# Patient Record
Sex: FEMALE | Race: BLACK OR AFRICAN AMERICAN | Employment: FULL TIME | URBAN - METROPOLITAN AREA
[De-identification: names, ages, dates, MRNs, and addresses within clinical notes are randomized per-mention and may not be internally consistent; named-entity substitution may affect disease eponyms.]

---

## 2017-04-01 ENCOUNTER — HOSPITAL ENCOUNTER (EMERGENCY)
Age: 47
Discharge: HOME | End: 2017-04-01
Attending: EMERGENCY MEDICINE
Payer: COMMERCIAL

## 2017-04-01 VITALS
TEMPERATURE: 98 F | RESPIRATION RATE: 16 BRPM | HEIGHT: 65 IN | WEIGHT: 201 LBS | BODY MASS INDEX: 33.49 KG/M2 | DIASTOLIC BLOOD PRESSURE: 75 MMHG | HEART RATE: 75 BPM | SYSTOLIC BLOOD PRESSURE: 121 MMHG | OXYGEN SATURATION: 100 %

## 2017-04-01 DIAGNOSIS — H10.33 ACUTE BACTERIAL CONJUNCTIVITIS OF BOTH EYES: Primary | ICD-10-CM

## 2017-04-01 PROCEDURE — 99282 EMERGENCY DEPT VISIT SF MDM: CPT

## 2017-04-01 RX ORDER — SULFACETAMIDE SODIUM 100 MG/ML
2 SOLUTION/ DROPS OPHTHALMIC EVERY 4 HOURS
Qty: 10 ML | Refills: 0 | Status: SHIPPED | OUTPATIENT
Start: 2017-04-01 | End: 2017-11-22 | Stop reason: ALTCHOICE

## 2017-04-01 RX ORDER — SPIRONOLACTONE 50 MG/1
50-100 TABLET, FILM COATED ORAL DAILY
Status: ON HOLD | COMMUNITY
End: 2017-11-24 | Stop reason: HOSPADM

## 2017-04-01 ASSESSMENT — ENCOUNTER SYMPTOMS
COUGH: 0
DIARRHEA: 0
EYE DISCHARGE: 1
SHORTNESS OF BREATH: 0
EYE REDNESS: 1
FACIAL SWELLING: 0
VOMITING: 0
ABDOMINAL PAIN: 0
EYE ITCHING: 1
CONSTIPATION: 0
COLOR CHANGE: 0

## 2017-04-01 ASSESSMENT — PAIN DESCRIPTION - ORIENTATION: ORIENTATION: RIGHT;LEFT

## 2017-04-01 ASSESSMENT — PAIN SCALES - GENERAL: PAINLEVEL_OUTOF10: 6

## 2017-04-01 ASSESSMENT — PAIN DESCRIPTION - LOCATION: LOCATION: EYE

## 2017-07-20 ENCOUNTER — APPOINTMENT (OUTPATIENT)
Dept: GENERAL RADIOLOGY | Age: 47
End: 2017-07-20
Payer: COMMERCIAL

## 2017-07-20 ENCOUNTER — HOSPITAL ENCOUNTER (EMERGENCY)
Age: 47
Discharge: HOME | End: 2017-07-20
Attending: EMERGENCY MEDICINE
Payer: COMMERCIAL

## 2017-07-20 VITALS
TEMPERATURE: 99 F | HEART RATE: 77 BPM | BODY MASS INDEX: 32.9 KG/M2 | HEIGHT: 65 IN | DIASTOLIC BLOOD PRESSURE: 65 MMHG | RESPIRATION RATE: 16 BRPM | OXYGEN SATURATION: 99 % | WEIGHT: 197.5 LBS | SYSTOLIC BLOOD PRESSURE: 104 MMHG

## 2017-07-20 DIAGNOSIS — M77.10 LATERAL EPICONDYLITIS  OF ELBOW: Primary | ICD-10-CM

## 2017-07-20 DIAGNOSIS — M25.522 LEFT ELBOW PAIN: ICD-10-CM

## 2017-07-20 DIAGNOSIS — B30.9 VIRAL CONJUNCTIVITIS OF RIGHT EYE: ICD-10-CM

## 2017-07-20 PROCEDURE — 93971 EXTREMITY STUDY: CPT

## 2017-07-20 PROCEDURE — 6370000000 HC RX 637 (ALT 250 FOR IP): Performed by: EMERGENCY MEDICINE

## 2017-07-20 PROCEDURE — 99284 EMERGENCY DEPT VISIT MOD MDM: CPT

## 2017-07-20 PROCEDURE — 73080 X-RAY EXAM OF ELBOW: CPT

## 2017-07-20 RX ORDER — IBUPROFEN 800 MG/1
800 TABLET ORAL EVERY 8 HOURS PRN
Qty: 30 TABLET | Refills: 0 | Status: SHIPPED | OUTPATIENT
Start: 2017-07-20 | End: 2017-11-22 | Stop reason: ALTCHOICE

## 2017-07-20 RX ORDER — IBUPROFEN 800 MG/1
800 TABLET ORAL ONCE
Status: DISCONTINUED | OUTPATIENT
Start: 2017-07-20 | End: 2017-07-20 | Stop reason: HOSPADM

## 2017-07-20 RX ORDER — GENTAMICIN SULFATE 3 MG/ML
2 SOLUTION/ DROPS OPHTHALMIC ONCE
Status: COMPLETED | OUTPATIENT
Start: 2017-07-20 | End: 2017-07-20

## 2017-07-20 RX ORDER — TETRACAINE HYDROCHLORIDE 5 MG/ML
1 SOLUTION OPHTHALMIC ONCE
Status: COMPLETED | OUTPATIENT
Start: 2017-07-20 | End: 2017-07-20

## 2017-07-20 RX ADMIN — FLUORESCEIN SODIUM 1 MG: 1 STRIP OPHTHALMIC at 12:51

## 2017-07-20 RX ADMIN — GENTAMICIN SULFATE 2 DROP: 3 SOLUTION/ DROPS OPHTHALMIC at 13:30

## 2017-07-20 RX ADMIN — TETRACAINE HYDROCHLORIDE 1 DROP: 5 SOLUTION OPHTHALMIC at 11:45

## 2017-07-20 ASSESSMENT — PAIN DESCRIPTION - FREQUENCY: FREQUENCY: CONTINUOUS

## 2017-07-20 ASSESSMENT — PAIN DESCRIPTION - ORIENTATION: ORIENTATION: LEFT

## 2017-07-20 ASSESSMENT — PAIN DESCRIPTION - DESCRIPTORS: DESCRIPTORS: SHARP;SHOOTING;STABBING

## 2017-07-20 ASSESSMENT — PAIN DESCRIPTION - LOCATION: LOCATION: ARM

## 2017-07-20 ASSESSMENT — PAIN SCALES - GENERAL: PAINLEVEL_OUTOF10: 9

## 2017-11-22 ENCOUNTER — APPOINTMENT (OUTPATIENT)
Dept: ULTRASOUND IMAGING | Age: 47
DRG: 684 | End: 2017-11-22
Payer: COMMERCIAL

## 2017-11-22 ENCOUNTER — APPOINTMENT (OUTPATIENT)
Dept: CT IMAGING | Age: 47
DRG: 684 | End: 2017-11-22
Payer: COMMERCIAL

## 2017-11-22 ENCOUNTER — HOSPITAL ENCOUNTER (INPATIENT)
Age: 47
LOS: 2 days | Discharge: HOME | DRG: 684 | End: 2017-11-24
Attending: EMERGENCY MEDICINE | Admitting: INTERNAL MEDICINE
Payer: COMMERCIAL

## 2017-11-22 DIAGNOSIS — I95.9 HYPOTENSION, UNSPECIFIED HYPOTENSION TYPE: ICD-10-CM

## 2017-11-22 DIAGNOSIS — R11.2 NAUSEA VOMITING AND DIARRHEA: Primary | ICD-10-CM

## 2017-11-22 DIAGNOSIS — G47.30 SLEEP APNEA, UNSPECIFIED TYPE: ICD-10-CM

## 2017-11-22 DIAGNOSIS — E86.0 DEHYDRATION: ICD-10-CM

## 2017-11-22 DIAGNOSIS — R19.7 NAUSEA VOMITING AND DIARRHEA: Primary | ICD-10-CM

## 2017-11-22 DIAGNOSIS — N28.9 MILD RENAL INSUFFICIENCY: ICD-10-CM

## 2017-11-22 PROBLEM — N17.9 AKI (ACUTE KIDNEY INJURY) (HCC): Status: ACTIVE | Noted: 2017-11-22

## 2017-11-22 PROBLEM — K52.9 GASTROENTERITIS: Status: ACTIVE | Noted: 2017-11-22

## 2017-11-22 LAB
% CKMB: 1.1 % (ref 0–3)
ABSOLUTE EOS #: 0.1 K/UL (ref 0–0.4)
ABSOLUTE IMMATURE GRANULOCYTE: ABNORMAL K/UL (ref 0–0.3)
ABSOLUTE LYMPH #: 1.8 K/UL (ref 1–4.8)
ABSOLUTE MONO #: 0.6 K/UL (ref 0.2–0.8)
ALBUMIN SERPL-MCNC: 3.6 G/DL (ref 3.5–5.2)
ALBUMIN SERPL-MCNC: 4.3 G/DL (ref 3.5–5.2)
ALBUMIN/GLOBULIN RATIO: ABNORMAL (ref 1–2.5)
ALBUMIN/GLOBULIN RATIO: NORMAL (ref 1–2.5)
ALP BLD-CCNC: 41 U/L (ref 35–104)
ALP BLD-CCNC: 46 U/L (ref 35–104)
ALT SERPL-CCNC: 13 U/L (ref 5–33)
ALT SERPL-CCNC: 15 U/L (ref 5–33)
AMYLASE: 73 U/L (ref 28–100)
ANION GAP SERPL CALCULATED.3IONS-SCNC: 13 MMOL/L (ref 9–17)
ANION GAP SERPL CALCULATED.3IONS-SCNC: 14 MMOL/L (ref 9–17)
AST SERPL-CCNC: 19 U/L
AST SERPL-CCNC: 19 U/L
BASOPHILS # BLD: 0 % (ref 0–2)
BASOPHILS ABSOLUTE: 0 K/UL (ref 0–0.2)
BETA-HYDROXYBUTYRATE: 0.16 MMOL/L (ref 0.02–0.27)
BILIRUB SERPL-MCNC: 0.22 MG/DL (ref 0.3–1.2)
BILIRUB SERPL-MCNC: 0.39 MG/DL (ref 0.3–1.2)
BILIRUBIN DIRECT: <0.08 MG/DL
BILIRUBIN URINE: NEGATIVE
BILIRUBIN, INDIRECT: NORMAL MG/DL (ref 0–1)
BUN BLDV-MCNC: 30 MG/DL (ref 6–20)
BUN BLDV-MCNC: 34 MG/DL (ref 6–20)
BUN/CREAT BLD: 18 (ref 9–20)
BUN/CREAT BLD: 18 (ref 9–20)
C DIFFICILE TOXINS, PCR: NORMAL
CALCIUM SERPL-MCNC: 7.7 MG/DL (ref 8.6–10.4)
CALCIUM SERPL-MCNC: 9.2 MG/DL (ref 8.6–10.4)
CAMPYLOBACTER PCR: NORMAL
CHLORIDE BLD-SCNC: 108 MMOL/L (ref 98–107)
CHLORIDE BLD-SCNC: 94 MMOL/L (ref 98–107)
CHP ED QC CHECK: YES
CK MB: <1 NG/ML
CKMB INTERPRETATION: NORMAL
CO2: 20 MMOL/L (ref 20–31)
CO2: 26 MMOL/L (ref 20–31)
COLOR: YELLOW
COMMENT UA: ABNORMAL
CORTISOL COLLECTION INFO: NORMAL
CORTISOL: 4.5 UG/DL (ref 2.7–18.4)
CREAT SERPL-MCNC: 1.63 MG/DL (ref 0.5–0.9)
CREAT SERPL-MCNC: 1.88 MG/DL (ref 0.5–0.9)
DATE, STOOL #1: NORMAL
DATE, STOOL #2: NORMAL
DATE, STOOL #3: NORMAL
DIFFERENTIAL TYPE: ABNORMAL
DIRECT EXAM: NORMAL
EKG ATRIAL RATE: 70 BPM
EKG P AXIS: 54 DEGREES
EKG P-R INTERVAL: 172 MS
EKG Q-T INTERVAL: 424 MS
EKG QRS DURATION: 82 MS
EKG QTC CALCULATION (BAZETT): 457 MS
EKG R AXIS: 15 DEGREES
EKG T AXIS: 53 DEGREES
EKG VENTRICULAR RATE: 70 BPM
EOSINOPHILS RELATIVE PERCENT: 2 % (ref 1–4)
GFR AFRICAN AMERICAN: 34 ML/MIN
GFR AFRICAN AMERICAN: 41 ML/MIN
GFR NON-AFRICAN AMERICAN: 28 ML/MIN
GFR NON-AFRICAN AMERICAN: 34 ML/MIN
GFR SERPL CREATININE-BSD FRML MDRD: ABNORMAL ML/MIN/{1.73_M2}
GLOBULIN: NORMAL G/DL (ref 1.5–3.8)
GLUCOSE BLD-MCNC: 101 MG/DL (ref 70–99)
GLUCOSE BLD-MCNC: 104 MG/DL
GLUCOSE BLD-MCNC: 104 MG/DL (ref 65–105)
GLUCOSE BLD-MCNC: 92 MG/DL (ref 70–99)
GLUCOSE URINE: NEGATIVE
HCT VFR BLD CALC: 32.4 % (ref 36–46)
HCT VFR BLD CALC: 35.7 % (ref 36–46)
HEMOCCULT SP1 STL QL: NEGATIVE
HEMOCCULT SP2 STL QL: NORMAL
HEMOCCULT SP3 STL QL: NORMAL
HEMOGLOBIN: 10.7 G/DL (ref 12–16)
HEMOGLOBIN: 11.8 G/DL (ref 12–16)
IMMATURE GRANULOCYTES: ABNORMAL %
KETONES, URINE: ABNORMAL
LACTIC ACID: 0.7 MMOL/L (ref 0.5–2.2)
LEUKOCYTE ESTERASE, URINE: NEGATIVE
LIPASE: 13 U/L (ref 13–60)
LYMPHOCYTES # BLD: 24 % (ref 24–44)
Lab: NORMAL
Lab: NORMAL
MAGNESIUM: 2.3 MG/DL (ref 1.6–2.6)
MCH RBC QN AUTO: 28.2 PG (ref 26–34)
MCH RBC QN AUTO: 28.2 PG (ref 26–34)
MCHC RBC AUTO-ENTMCNC: 32.9 G/DL (ref 31–37)
MCHC RBC AUTO-ENTMCNC: 33.1 G/DL (ref 31–37)
MCV RBC AUTO: 85.2 FL (ref 80–100)
MCV RBC AUTO: 85.6 FL (ref 80–100)
MONOCYTES # BLD: 9 % (ref 1–7)
MYOGLOBIN: <21 NG/ML (ref 25–58)
NITRITE, URINE: NEGATIVE
PDW BLD-RTO: 14.7 % (ref 11.5–14.5)
PDW BLD-RTO: 15.5 % (ref 11.5–14.5)
PH UA: 5.5 (ref 5–8)
PLATELET # BLD: 225 K/UL (ref 130–400)
PLATELET # BLD: 272 K/UL (ref 130–400)
PLATELET ESTIMATE: ABNORMAL
PMV BLD AUTO: 9.3 FL (ref 6–12)
PMV BLD AUTO: ABNORMAL FL (ref 6–12)
POTASSIUM SERPL-SCNC: 4.2 MMOL/L (ref 3.7–5.3)
POTASSIUM SERPL-SCNC: 5 MMOL/L (ref 3.7–5.3)
PROTEIN UA: NEGATIVE
RBC # BLD: 3.81 M/UL (ref 4–5.2)
RBC # BLD: 4.17 M/UL (ref 4–5.2)
RBC # BLD: ABNORMAL 10*6/UL
SALMONELLA PCR: NORMAL
SEG NEUTROPHILS: 65 % (ref 36–66)
SEGMENTED NEUTROPHILS ABSOLUTE COUNT: 4.8 K/UL (ref 1.8–7.7)
SHIGATOXIN GENE PCR: NORMAL
SHIGELLA SP PCR: NORMAL
SODIUM BLD-SCNC: 134 MMOL/L (ref 135–144)
SODIUM BLD-SCNC: 141 MMOL/L (ref 135–144)
SPECIFIC GRAVITY UA: 1.02 (ref 1–1.03)
SPECIMEN DESCRIPTION: NORMAL
SPECIMEN: NORMAL
STATUS: NORMAL
STATUS: NORMAL
TIME, STOOL #1: 350
TIME, STOOL #2: NORMAL
TIME, STOOL #3: NORMAL
TOTAL CK: 88 U/L (ref 26–192)
TOTAL PROTEIN: 7.1 G/DL (ref 6.4–8.3)
TOTAL PROTEIN: 8.2 G/DL (ref 6.4–8.3)
TROPONIN INTERP: ABNORMAL
TROPONIN T: <0.03 NG/ML
TURBIDITY: CLEAR
URINE HGB: NEGATIVE
UROBILINOGEN, URINE: NORMAL
WBC # BLD: 6.5 K/UL (ref 3.5–11)
WBC # BLD: 7.3 K/UL (ref 3.5–11)
WBC # BLD: ABNORMAL 10*3/UL

## 2017-11-22 PROCEDURE — 87493 C DIFF AMPLIFIED PROBE: CPT

## 2017-11-22 PROCEDURE — 80053 COMPREHEN METABOLIC PANEL: CPT

## 2017-11-22 PROCEDURE — 84484 ASSAY OF TROPONIN QUANT: CPT

## 2017-11-22 PROCEDURE — 2000000000 HC ICU R&B

## 2017-11-22 PROCEDURE — 82272 OCCULT BLD FECES 1-3 TESTS: CPT

## 2017-11-22 PROCEDURE — 36415 COLL VENOUS BLD VENIPUNCTURE: CPT

## 2017-11-22 PROCEDURE — 83874 ASSAY OF MYOGLOBIN: CPT

## 2017-11-22 PROCEDURE — 85027 COMPLETE CBC AUTOMATED: CPT

## 2017-11-22 PROCEDURE — 2580000003 HC RX 258: Performed by: EMERGENCY MEDICINE

## 2017-11-22 PROCEDURE — 6360000002 HC RX W HCPCS: Performed by: EMERGENCY MEDICINE

## 2017-11-22 PROCEDURE — 82533 TOTAL CORTISOL: CPT

## 2017-11-22 PROCEDURE — 87505 NFCT AGENT DETECTION GI: CPT

## 2017-11-22 PROCEDURE — 85025 COMPLETE CBC W/AUTO DIFF WBC: CPT

## 2017-11-22 PROCEDURE — 93005 ELECTROCARDIOGRAM TRACING: CPT

## 2017-11-22 PROCEDURE — 96375 TX/PRO/DX INJ NEW DRUG ADDON: CPT

## 2017-11-22 PROCEDURE — 6370000000 HC RX 637 (ALT 250 FOR IP): Performed by: NURSE PRACTITIONER

## 2017-11-22 PROCEDURE — 2580000003 HC RX 258: Performed by: INTERNAL MEDICINE

## 2017-11-22 PROCEDURE — 2500000003 HC RX 250 WO HCPCS: Performed by: EMERGENCY MEDICINE

## 2017-11-22 PROCEDURE — 6360000002 HC RX W HCPCS: Performed by: NURSE PRACTITIONER

## 2017-11-22 PROCEDURE — 76775 US EXAM ABDO BACK WALL LIM: CPT

## 2017-11-22 PROCEDURE — 74176 CT ABD & PELVIS W/O CONTRAST: CPT

## 2017-11-22 PROCEDURE — 99223 1ST HOSP IP/OBS HIGH 75: CPT | Performed by: INTERNAL MEDICINE

## 2017-11-22 PROCEDURE — 83605 ASSAY OF LACTIC ACID: CPT

## 2017-11-22 PROCEDURE — 87329 GIARDIA AG IA: CPT

## 2017-11-22 PROCEDURE — 82150 ASSAY OF AMYLASE: CPT

## 2017-11-22 PROCEDURE — 96361 HYDRATE IV INFUSION ADD-ON: CPT

## 2017-11-22 PROCEDURE — 83735 ASSAY OF MAGNESIUM: CPT

## 2017-11-22 PROCEDURE — 87086 URINE CULTURE/COLONY COUNT: CPT

## 2017-11-22 PROCEDURE — 82947 ASSAY GLUCOSE BLOOD QUANT: CPT

## 2017-11-22 PROCEDURE — 80048 BASIC METABOLIC PNL TOTAL CA: CPT

## 2017-11-22 PROCEDURE — 81003 URINALYSIS AUTO W/O SCOPE: CPT

## 2017-11-22 PROCEDURE — 99285 EMERGENCY DEPT VISIT HI MDM: CPT

## 2017-11-22 PROCEDURE — 80076 HEPATIC FUNCTION PANEL: CPT

## 2017-11-22 PROCEDURE — 87425 ROTAVIRUS AG IA: CPT

## 2017-11-22 PROCEDURE — 82550 ASSAY OF CK (CPK): CPT

## 2017-11-22 PROCEDURE — 83690 ASSAY OF LIPASE: CPT

## 2017-11-22 PROCEDURE — C9113 INJ PANTOPRAZOLE SODIUM, VIA: HCPCS | Performed by: EMERGENCY MEDICINE

## 2017-11-22 PROCEDURE — 82010 KETONE BODYS QUAN: CPT

## 2017-11-22 PROCEDURE — 96374 THER/PROPH/DIAG INJ IV PUSH: CPT

## 2017-11-22 PROCEDURE — 82553 CREATINE MB FRACTION: CPT

## 2017-11-22 RX ORDER — SODIUM CHLORIDE 0.9 % (FLUSH) 0.9 %
10 SYRINGE (ML) INJECTION EVERY 12 HOURS SCHEDULED
Status: DISCONTINUED | OUTPATIENT
Start: 2017-11-22 | End: 2017-11-24 | Stop reason: HOSPADM

## 2017-11-22 RX ORDER — PROMETHAZINE HYDROCHLORIDE 25 MG/ML
12.5 INJECTION, SOLUTION INTRAMUSCULAR; INTRAVENOUS ONCE
Status: COMPLETED | OUTPATIENT
Start: 2017-11-22 | End: 2017-11-22

## 2017-11-22 RX ORDER — POTASSIUM CHLORIDE 20 MEQ/1
40 TABLET, EXTENDED RELEASE ORAL PRN
Status: DISCONTINUED | OUTPATIENT
Start: 2017-11-22 | End: 2017-11-24 | Stop reason: HOSPADM

## 2017-11-22 RX ORDER — NICOTINE 21 MG/24HR
1 PATCH, TRANSDERMAL 24 HOURS TRANSDERMAL DAILY PRN
Status: DISCONTINUED | OUTPATIENT
Start: 2017-11-22 | End: 2017-11-22

## 2017-11-22 RX ORDER — DEXTROSE MONOHYDRATE 50 MG/ML
100 INJECTION, SOLUTION INTRAVENOUS PRN
Status: DISCONTINUED | OUTPATIENT
Start: 2017-11-22 | End: 2017-11-24 | Stop reason: HOSPADM

## 2017-11-22 RX ORDER — DEXTROSE MONOHYDRATE 25 G/50ML
12.5 INJECTION, SOLUTION INTRAVENOUS PRN
Status: DISCONTINUED | OUTPATIENT
Start: 2017-11-22 | End: 2017-11-24 | Stop reason: HOSPADM

## 2017-11-22 RX ORDER — POTASSIUM CHLORIDE 7.45 MG/ML
10 INJECTION INTRAVENOUS PRN
Status: DISCONTINUED | OUTPATIENT
Start: 2017-11-22 | End: 2017-11-24 | Stop reason: HOSPADM

## 2017-11-22 RX ORDER — POTASSIUM CHLORIDE 20MEQ/15ML
40 LIQUID (ML) ORAL PRN
Status: DISCONTINUED | OUTPATIENT
Start: 2017-11-22 | End: 2017-11-24 | Stop reason: HOSPADM

## 2017-11-22 RX ORDER — SODIUM CHLORIDE 0.9 % (FLUSH) 0.9 %
10 SYRINGE (ML) INJECTION PRN
Status: DISCONTINUED | OUTPATIENT
Start: 2017-11-22 | End: 2017-11-24 | Stop reason: HOSPADM

## 2017-11-22 RX ORDER — ONDANSETRON 2 MG/ML
8 INJECTION INTRAMUSCULAR; INTRAVENOUS ONCE
Status: COMPLETED | OUTPATIENT
Start: 2017-11-22 | End: 2017-11-22

## 2017-11-22 RX ORDER — 0.9 % SODIUM CHLORIDE 0.9 %
10 VIAL (ML) INJECTION ONCE
Status: COMPLETED | OUTPATIENT
Start: 2017-11-22 | End: 2017-11-22

## 2017-11-22 RX ORDER — MAGNESIUM SULFATE 1 G/100ML
1 INJECTION INTRAVENOUS PRN
Status: DISCONTINUED | OUTPATIENT
Start: 2017-11-22 | End: 2017-11-24 | Stop reason: HOSPADM

## 2017-11-22 RX ORDER — NICOTINE POLACRILEX 4 MG
15 LOZENGE BUCCAL PRN
Status: DISCONTINUED | OUTPATIENT
Start: 2017-11-22 | End: 2017-11-24 | Stop reason: HOSPADM

## 2017-11-22 RX ORDER — 0.9 % SODIUM CHLORIDE 0.9 %
1000 INTRAVENOUS SOLUTION INTRAVENOUS ONCE
Status: COMPLETED | OUTPATIENT
Start: 2017-11-22 | End: 2017-11-22

## 2017-11-22 RX ORDER — DOPAMINE HYDROCHLORIDE 160 MG/100ML
5 INJECTION, SOLUTION INTRAVENOUS CONTINUOUS
Status: DISCONTINUED | OUTPATIENT
Start: 2017-11-22 | End: 2017-11-24 | Stop reason: HOSPADM

## 2017-11-22 RX ORDER — ACETAMINOPHEN 325 MG/1
650 TABLET ORAL EVERY 4 HOURS PRN
Status: DISCONTINUED | OUTPATIENT
Start: 2017-11-22 | End: 2017-11-24 | Stop reason: HOSPADM

## 2017-11-22 RX ORDER — 0.9 % SODIUM CHLORIDE 0.9 %
2000 INTRAVENOUS SOLUTION INTRAVENOUS ONCE
Status: COMPLETED | OUTPATIENT
Start: 2017-11-22 | End: 2017-11-22

## 2017-11-22 RX ORDER — PANTOPRAZOLE SODIUM 40 MG/10ML
40 INJECTION, POWDER, LYOPHILIZED, FOR SOLUTION INTRAVENOUS ONCE
Status: COMPLETED | OUTPATIENT
Start: 2017-11-22 | End: 2017-11-22

## 2017-11-22 RX ORDER — BISACODYL 10 MG
10 SUPPOSITORY, RECTAL RECTAL DAILY PRN
Status: DISCONTINUED | OUTPATIENT
Start: 2017-11-22 | End: 2017-11-24 | Stop reason: HOSPADM

## 2017-11-22 RX ORDER — SIMETHICONE 80 MG
80 TABLET,CHEWABLE ORAL EVERY 6 HOURS PRN
Status: DISCONTINUED | OUTPATIENT
Start: 2017-11-22 | End: 2017-11-24 | Stop reason: HOSPADM

## 2017-11-22 RX ORDER — ONDANSETRON 2 MG/ML
4 INJECTION INTRAMUSCULAR; INTRAVENOUS EVERY 6 HOURS PRN
Status: DISCONTINUED | OUTPATIENT
Start: 2017-11-22 | End: 2017-11-24 | Stop reason: HOSPADM

## 2017-11-22 RX ORDER — SODIUM CHLORIDE 9 MG/ML
INJECTION, SOLUTION INTRAVENOUS CONTINUOUS
Status: DISCONTINUED | OUTPATIENT
Start: 2017-11-22 | End: 2017-11-24 | Stop reason: HOSPADM

## 2017-11-22 RX ORDER — LEVOFLOXACIN 5 MG/ML
500 INJECTION, SOLUTION INTRAVENOUS ONCE
Status: COMPLETED | OUTPATIENT
Start: 2017-11-22 | End: 2017-11-22

## 2017-11-22 RX ADMIN — HYDROCORTISONE SODIUM SUCCINATE 100 MG: 100 INJECTION, POWDER, FOR SOLUTION INTRAMUSCULAR; INTRAVENOUS at 05:50

## 2017-11-22 RX ADMIN — ONDANSETRON 8 MG: 2 INJECTION INTRAMUSCULAR; INTRAVENOUS at 03:27

## 2017-11-22 RX ADMIN — LEVOFLOXACIN 500 MG: 5 INJECTION, SOLUTION INTRAVENOUS at 08:19

## 2017-11-22 RX ADMIN — SODIUM CHLORIDE: 9 INJECTION, SOLUTION INTRAVENOUS at 06:37

## 2017-11-22 RX ADMIN — SODIUM CHLORIDE 2000 ML: 9 INJECTION, SOLUTION INTRAVENOUS at 03:27

## 2017-11-22 RX ADMIN — SODIUM CHLORIDE 1000 ML: 9 INJECTION, SOLUTION INTRAVENOUS at 10:21

## 2017-11-22 RX ADMIN — Medication 10 ML: at 03:27

## 2017-11-22 RX ADMIN — PROMETHAZINE HYDROCHLORIDE 12.5 MG: 25 INJECTION, SOLUTION INTRAMUSCULAR; INTRAVENOUS at 03:31

## 2017-11-22 RX ADMIN — ENOXAPARIN SODIUM 40 MG: 40 INJECTION SUBCUTANEOUS at 11:23

## 2017-11-22 RX ADMIN — PANTOPRAZOLE SODIUM 40 MG: 40 INJECTION, POWDER, FOR SOLUTION INTRAVENOUS at 03:27

## 2017-11-22 RX ADMIN — METRONIDAZOLE 500 MG: 500 SOLUTION INTRAVENOUS at 06:37

## 2017-11-22 RX ADMIN — SIMETHICONE 80 MG: 80 TABLET, CHEWABLE ORAL at 23:40

## 2017-11-22 RX ADMIN — SODIUM CHLORIDE 1000 ML: 9 INJECTION, SOLUTION INTRAVENOUS at 04:00

## 2017-11-22 ASSESSMENT — ENCOUNTER SYMPTOMS
ABDOMINAL PAIN: 1
DIARRHEA: 1
NAUSEA: 1
RESPIRATORY NEGATIVE: 1
VOMITING: 1

## 2017-11-22 ASSESSMENT — PAIN SCALES - GENERAL
PAINLEVEL_OUTOF10: 0
PAINLEVEL_OUTOF10: 8

## 2017-11-22 ASSESSMENT — PAIN DESCRIPTION - DESCRIPTORS: DESCRIPTORS: CRAMPING

## 2017-11-22 ASSESSMENT — PAIN DESCRIPTION - LOCATION: LOCATION: ABDOMEN

## 2017-11-22 NOTE — ED NOTES
Patient presents to the ED with complaints of abdominal pain, nausea and diarrhea. Patient states that her symptoms started on Saturday and have progressively gotten worse over the past few days. Patient states that she has had 3 episodes of emesis and unknown amount diarrhea but states its been a lot and its just watery at this point. Patient is alert and oriented, but states she has been dizzy and weak since the symptoms started, respirations are even and unlabored, skin warm dry and intact. Abdomen is soft but tender in the lower quadrants with active bowel sounds.       Michelle Copeland RN  11/22/17 9050

## 2017-11-22 NOTE — FLOWSHEET NOTE
Patient sleeping; no family present.  prayed for patient; left note of support on tray table. Spiritual Care will follow as needed.      11/22/17 1433   Encounter Summary   Services provided to: Patient   Referral/Consult From: Carlos   Continue Visiting (11/22/17)   Complexity of Encounter Low   Length of Encounter 15 minutes   Routine   Type Initial   Assessment Sleeping   Intervention Prayer  (Left note)   Outcome Did not respond

## 2017-11-22 NOTE — H&P
[facility]    HISTORY AND PHYSICAL EXAMINATION            Date:   11/22/2017  Patient name:  Lukas Ghotra  Date of admission:  11/22/2017  3:07 AM  MRN:   [id number]  Account:  [de-identified]  YOB: 1970  PCP:    Citlaly Calhoun CNP  Room:   2034/2034-01  Code Status:    Full Code    Chief Complaint:     Chief Complaint   Patient presents with    Abdominal Pain    Emesis    Diarrhea       History Obtained From:     patient    History of Present Illness: The patient is a 50 y.o. Non-/non  female who presents with Abdominal Pain; Emesis; and Diarrhea   and she is admitted to the hospital for the management of  N/v/d. She had not been able to hold anything down the last 24h. Has had nausea for last 1 1/2 weeks, worse last couple days. Some crampy lower abd pain. No one around her has been ill. bp in 76s in er. Past Medical History:     Past Medical History:   Diagnosis Date    Bronchitis     1 YR AGO    Constipation     Hypertension     Migraines     Pain     LOWER LEFT ABD    Rosacea     Urinary frequency     Uterine fibroid         Past Surgical History:     Past Surgical History:   Procedure Laterality Date    HYSTERECTOMY  6/14/2013    with left SO    MYOMECTOMY  02/1997    MULTIPLE FIBROIDS        Medications Prior to Admission:     Prior to Admission medications    Medication Sig Start Date End Date Taking? Authorizing Provider   metFORMIN (GLUCOPHAGE) 500 MG tablet Take 500 mg by mouth 2 times daily (with meals)   Yes Historical Provider, MD   spironolactone (ALDACTONE) 25 MG tablet Take 25 mg by mouth daily   Yes Historical Provider, MD   olmesartan-hydrochlorothiazide (BENICAR HCT) 40-12.5 MG per tablet Take 1 tablet by mouth daily. Yes Historical Provider, MD        Allergies:     Review of patient's allergies indicates no known allergies.     Social History:     Tobacco:    reports that she has never smoked. She has never used smokeless tobacco.  Alcohol:      reports that she drinks alcohol. Drug Use:  reports that she does not use drugs. Family History:     Family History   Problem Relation Age of Onset    Hypertension Maternal Grandmother     Diabetes Maternal Grandmother     Hypertension Mother        Review of Systems:     Positive and Negative as described in HPI. CONSTITUTIONAL:  negative for fevers, chills, sweats, fatigue, weight loss  HEENT:  negative for vision, hearing changes, runny nose, throat pain  RESPIRATORY:  negative for shortness of breath, cough, congestion, wheezing. CARDIOVASCULAR:  negative for chest pain, palpitations. GASTROINTESTINAL:  negative for constipation, change in bowel habits, abdominal pain   GENITOURINARY:  negative for difficulty of urination, burning with urination, frequency   INTEGUMENT:  negative for rash, skin lesions, easy bruising   HEMATOLOGIC/LYMPHATIC:  negative for swelling/edema   ALLERGIC/IMMUNOLOGIC:  negative for urticaria , itching  ENDOCRINE:  negative increase in drinking, increase in urination, hot or cold intolerance  MUSCULOSKELETAL:  negative joint pains, muscle aches, swelling of joints  NEUROLOGICAL:  negative for headaches, pain; her teddy hands get numb when she sleeps  BEHAVIOR/PSYCH:  negative for depression, anxiety    Physical Exam:   BP (!) 104/56   Pulse 77   Temp 97.8 °F (36.6 °C) (Oral)   Resp 16   Ht 5' 5\" (1.651 m)   Wt 200 lb (90.7 kg)   LMP 2013   SpO2 99%   BMI 33.28 kg/m²   Temp (24hrs), Av.8 °F (36.6 °C), Min:97.8 °F (36.6 °C), Max:97.8 °F (36.6 °C)    Recent Labs      17   0318   POCGLU  104     No intake or output data in the 24 hours ending 17 1012    General Appearance:  alert, well appearing, and in no acute distress  Mental status: oriented to person, place, and time with normal affect  Head:  normocephalic, atraumatic.   Eye: no icterus, redness, pupils equal and reactive, extraocular eye movements intact, conjunctiva clear  Ear: normal external ear, no discharge, hearing intact  Nose:  no drainage noted  Mouth: mucous membranes moist  Neck: supple, no carotid bruits, thyroid not palpable  Lungs: Bilateral equal air entry, clear to ausculation, no wheezing, rales or rhonchi, normal effort  Cardiovascular: normal rate, regular rhythm, no murmur, gallop, rub. Abdomen: Soft, nontender, nondistended, normal bowel sounds, no hepatomegaly or splenomegaly  Neurologic: There are no new focal motor or sensory deficits, normal muscle tone and bulk, no abnormal sensation, normal speech, cranial nerves II through XII grossly intact  Skin: No gross lesions, rashes, bruising or bleeding on exposed skin area  Extremities:  peripheral pulses palpable, no pedal edema or calf pain with palpation  Psych: normal affect     Osteopathic Examination: negative for  myalgias, arthralgias, pain, swelling, stiffness, decreased range of motion, muscle weakness or bone pain. There is no evidence of kyphosis, scoliosis, or lordosis.     Investigations:      Laboratory Testing:  Recent Results (from the past 24 hour(s))   Amylase    Collection Time: 11/22/17  3:15 AM   Result Value Ref Range    Amylase 73 28 - 100 U/L   Basic Metabolic Panel    Collection Time: 11/22/17  3:15 AM   Result Value Ref Range    Glucose 101 (H) 70 - 99 mg/dL    BUN 34 (H) 6 - 20 mg/dL    CREATININE 1.88 (H) 0.50 - 0.90 mg/dL    Bun/Cre Ratio 18 9 - 20    Calcium 9.2 8.6 - 10.4 mg/dL    Sodium 134 (L) 135 - 144 mmol/L    Potassium 4.2 3.7 - 5.3 mmol/L    Chloride 94 (L) 98 - 107 mmol/L    CO2 26 20 - 31 mmol/L    Anion Gap 14 9 - 17 mmol/L    GFR Non-African American 28 (L) >60 mL/min    GFR  34 (L) >60 mL/min    GFR Comment          GFR Staging NOT REPORTED    CBC Auto Differential    Collection Time: 11/22/17  3:15 AM   Result Value Ref Range    WBC 7.3 3.5 - 11.0 k/uL    RBC 4.17 4.0 - 5.2 m/uL    Hemoglobin 11.8 (L) 14. 7 (H) 11.5 - 14.5 %    Platelets 823 844 - 903 k/uL    MPV NOT REPORTED 6.0 - 12.0 fL   CK isoenzymes    Collection Time: 11/22/17  7:31 AM   Result Value Ref Range    Total CK 88 26 - 192 U/L    CK-MB <1.0 <5.4 ng/mL    % CKMB 1.1 0.0 - 3.0 %    CKMB Interpretation NORMAL ISOENZYME PATTERN    Comprehensive Metabolic Panel    Collection Time: 11/22/17  7:31 AM   Result Value Ref Range    Glucose 92 70 - 99 mg/dL    BUN 30 (H) 6 - 20 mg/dL    CREATININE 1.63 (H) 0.50 - 0.90 mg/dL    Bun/Cre Ratio 18 9 - 20    Calcium 7.7 (L) 8.6 - 10.4 mg/dL    Sodium 141 135 - 144 mmol/L    Potassium 5.0 3.7 - 5.3 mmol/L    Chloride 108 (H) 98 - 107 mmol/L    CO2 20 20 - 31 mmol/L    Anion Gap 13 9 - 17 mmol/L    Alkaline Phosphatase 41 35 - 104 U/L    ALT 13 5 - 33 U/L    AST 19 <32 U/L    Total Bilirubin 0.22 (L) 0.3 - 1.2 mg/dL    Total Protein 7.1 6.4 - 8.3 g/dL    Alb 3.6 3.5 - 5.2 g/dL    Albumin/Globulin Ratio NOT REPORTED 1.0 - 2.5    GFR Non- 34 (L) >60 mL/min    GFR  41 (L) >60 mL/min    GFR Comment          GFR Staging NOT REPORTED    TROP/MYOGLOBIN    Collection Time: 11/22/17  7:31 AM   Result Value Ref Range    Troponin T <0.03 <0.03 ng/mL    Troponin Interp          Myoglobin <21 (L) 25 - 58 ng/mL       Imaging/Diagnostics:    Ct abd:  Impression   1. There is fluid-filled bowel, compatible with patient's clinical history of   diarrhea. 2. Indeterminate low-attenuation lesion in the right lobe of the liver,   measuring approximately 3.7 x 3.2 cm, which is of uncertain etiology. Dedicated MRI of the liver without and with intravenous contrast could be   performed for further evaluation if clinically indicated. 3. Atherosclerotic disease.          Assessment :      Primary Problem  FARIDA (acute kidney injury) [facility])    Active Hospital Problems    Diagnosis Date Noted    Hypotension [I95.9] 11/22/2017    FARIDA (acute kidney injury) ([facility] [address] [N17.9] 11/22/2017    Dehydration [E86.0] 11/22/2017    Gastroenteritis [K52.9] 11/22/2017   liver lesion    Plan:     Patient status Admit as inpatient in the  Medical ICU    1. ivf bolus  2. ivf maintenance  3. c dif pending  4. No further abx or steroids  5. Repeat labs in am  6. Diet  7. Hold metformin due to cr  8. Hold htn meds  9. Suspect she became very dehydrated as caus eof hypotension-doubt sepsis  10. outpt liver lesion w/u    Consultations:   IP CONSULT TO INTERNAL MEDICINE  IP CONSULT TO CRITICAL CARE  IP CONSULT TO CRITICAL CARE     Patient is admitted as inpatient status because of co-morbidities listed above, severity of signs and symptoms as outlined, requirement for current medical therapies and most importantly because of direct risk to patient if care not provided in a hospital setting.     Carlton Gomes DO  11/22/2017  10:12 AM    Copy sent to Dr. Roney Mcmullen, CNP

## 2017-11-22 NOTE — CARE COORDINATION
Case Management Initial Discharge Plan  Rashida Terrell,         Readmission Risk              Readmission Risk:        1.25       Age 72 or Greater:  0    Admitted from SNF or Requires Paid or Family Care:  0    Currently has CHF,COPD,ARF,CRI,or is on dialysis:  0    Takes more than 5 Prescription Medications:  0    Takes Digoxin,Insulin,Anticoagulants,Narcotics or ASA/Plavix:  [address] in Past 12 Months:  0    On Disability:  0    Patient Considers own Health:  1.25            Met with:patient to discuss discharge plans. Information verified: address, contacts, phone number, , insurance Yes  PCP: Carlton Lowe CNP  Date of last visit: a few months ago      Insurance Provider: MMO    Discharge Planning  Current Residence:     Living Arrangements:  Spouse/Significant Other, Children   Home has 1 stories/3 stairs to climb  Support Systems:  Spouse/Significant Other, Children, Family Members, Friends/Neighbors  Current Services PTA:    Supplier: n/a  Patient able to perform ADL's:Independent  DME used to aid ambulation prior to admission: none /during admission none    Potential Assistance Needed:  N/A    Pharmacy: [facility] Medications:  No  Does patient want to participate in local refill/ meds to beds program?  No    Patient agreeable to home care: not needed  Freedom of choice provided:  n/a      Type of Home Care Services:  None  Patient expects to be discharged to:  home    Prior SNF/Rehab Placement and Facility: none  Agreeable to SNF/Rehab: not needed  [place] of choice provided: n/a   Evaluation: n/a    Expected Discharge date:  17  Follow Up Appointment: Best Day/ Time:      Transportation provider: lauren  Transportation arrangements needed for discharge: No    Discharge Plan:     Unable to make f/u appt with PCP due to office closed for holiday. Pt aware to make appt within a week of discharge.     Pt denies home needs at

## 2017-11-22 NOTE — ED PROVIDER NOTES
 Mother         SOCIAL HISTORY      reports that she has never smoked. She has never used smokeless tobacco. She reports that she drinks alcohol. She reports that she does not use drugs. REVIEW OF SYSTEMS    (2-9 systems for level 4, 10 or more for level 5)     Review of Systems   Constitutional: Positive for activity change, appetite change and fatigue. HENT: Negative. Respiratory: Negative. Cardiovascular: Negative. Gastrointestinal: Positive for abdominal pain, diarrhea, nausea and vomiting. Genitourinary: Positive for decreased urine volume. Musculoskeletal: Negative. Skin: Positive for pallor. Neurological: Positive for dizziness, weakness and light-headedness. Hematological: Negative. Psychiatric/Behavioral: Negative. Except as noted above the remainder of the review of systems was reviewed and negative. PHYSICAL EXAM    (up to 7 for level 4, 8 or more for level 5)     Vitals:    17 0307 17 0405 17 0534 17 0554   BP: (!) 74/54 (!) 94/47 (!) 85/49 (!) 78/47   Pulse: 83  67 73   Resp: 14      Temp: 97.8 °F (36.6 °C)      TempSrc: Oral      SpO2: 100%  100%    Weight: 200 lb (90.7 kg)      Height: 5' 5\" (1.651 m)          Physical exam reflects a fatigued-appearing female who is hypotensive. Vital signs otherwise stable to include pulse ox 100% on room air. She is not hypoxic. She is alert conversive and appropriate in behavior. Integument warm and dry. Oropharyngeal exam shows dry membranes no lesion. No cervical lymphadenopathy JVD or bruits. Heart regular rate and rhythm normal S1 and S2 no murmurs rubs gallops. Lungs are clear to auscultation without wheezes rales or rhonchi. Abdomen is soft throughout she has no focal pain rebound or guarding. Bowel sounds are appreciated. She does have diffuse subjective crampy discomfort. Extremities show no gross abnormality. Integument without rash or lesion.   No neurovascular deficits are noted      DIAGNOSTIC RESULTS     EKG demonstrates sinus rhythm in the 70s. noted no ectopy or ischemic change appreciated. low voltage appreciated. Cardiac imaging noted on abdominal CT does not demonstrate pericardial effusion or suggestion of tamponade. RADIOLOGY:   Non-plain film images such as CT, Ultrasound and MRI are read by the radiologist. Plain radiographic images are visualized and preliminarily interpreted by the emergency physician with the below findings:    CT ABDOMEN PELVIS WO IV CONTRAST   Status: Preliminary result   Order Providers     Gianfranco Quiñonez MD          [facility] Radiologists     Read Date Phone Pager   Zoya Nearing Nov 22, 2017 [phone]    Impression   1. There is fluid-filled bowel, compatible with patient's clinical history of   diarrhea. 2. Indeterminate low-attenuation lesion in the right lobe of the liver,   measuring approximately 3.7 x 3.2 cm which is of uncertain etiology. Dedicated MRI of the liver without and with intravenous contrast could be   performed for further evaluation if clinically indicated. 3. Atherosclerotic disease.             Interpretation per the Radiologist below, if available at the time of this note:    CT ABDOMEN PELVIS WO IV CONTRAST   Preliminary Result   1. There is fluid-filled bowel, compatible with patient's clinical history of   diarrhea. 2. Indeterminate low-attenuation lesion in the right lobe of the liver,   measuring approximately 3.7 x 3.2 cm which is of uncertain etiology. Dedicated MRI of the liver without and with intravenous contrast could be   performed for further evaluation if clinically indicated. 3. Atherosclerotic disease.                  LABS:  Labs Reviewed   BASIC METABOLIC PANEL - Abnormal; Notable for the following:        Result Value    Glucose 101 (*)     BUN 34 (*)     CREATININE 1.88 (*)     Sodium 134 (*)     Chloride 94 (*)     GFR Non- 28 (*) GFR  34 (*)     All other components within normal limits   CBC WITH AUTO DIFFERENTIAL - Abnormal; Notable for the following:     Hemoglobin 11.8 (*)     Hematocrit 35.7 (*)     RDW 15.5 (*)     Monocytes 9 (*)     All other components within normal limits   URINALYSIS - Abnormal; Notable for the following:     Ketones, Urine TRACE (*)     All other components within normal limits   POCT GLUCOSE - Normal   URINE CULTURE   CULTURE STOOL   C DIFF TOXIN B BY RT PCR   GIARDIA ANTIGEN   AMYLASE   HEPATIC FUNCTION PANEL   LIPASE   LACTIC ACID   BETA-HYDROXYBUTYRATE   BLOOD OCCULT STOOL SCREEN #1   MAGNESIUM   ROTAVIRUS ANTIGEN, STOOL   CK ISOENZYMES   TROP/MYOGLOBIN   POC GLUCOSE FINGERSTICK     Results for Augustina Bravo (MRN [id number]) as of 11/22/2017 03:31   Ref.  Range 11/22/2017 03:15 11/22/2017 03:18 11/22/2017 03:20   Glucose Latest Units: mg/dL   104   POC Glucose Latest Ref Range: 65 - 105 mg/dL  104    WBC Latest Ref Range: 3.5 - 11.0 k/uL 7.3     RBC Latest Ref Range: 4.0 - 5.2 m/uL 4.17     Hemoglobin Quant Latest Ref Range: 12.0 - 16.0 g/dL 11.8 (L)     Hematocrit Latest Ref Range: 36 - 46 % 35.7 (L)     MCV Latest Ref Range: 80 - 100 fL 85.6     MCH Latest Ref Range: 26 - 34 pg 28.2     MCHC Latest Ref Range: 31 - 37 g/dL 32.9     MPV Latest Ref Range: 6.0 - 12.0 fL 9.3     RDW Latest Ref Range: 11.5 - 14.5 % 15.5 (H)     Platelet Count Latest Ref Range: 130 - 400 k/uL 272     Platelet Estimate Unknown NOT REPORTED     Absolute Mono # Latest Ref Range: 0.2 - 0.8 k/uL 0.60     Eosinophils % Latest Ref Range: 1 - 4 % 2     Basophils # Latest Ref Range: 0.0 - 0.2 k/uL 0.00     Differential Type Unknown NOT REPORTED     Seg Neutrophils Latest Ref Range: 36 - 66 % 65     Segs Absolute Latest Ref Range: 1.8 - 7.7 k/uL 4.80     Lymphocytes Latest Ref Range: 24 - 44 % 24     Absolute Lymph # Latest Ref Range: 1.0 - 4.8 k/uL 1.80     Monocytes Latest Ref Range: 1 - 7 % 9 (H)     Absolute Eos # Latest 4.17      Hemoglobin Quant Latest Ref Range: 12.0 - 16.0 g/dL 11.8 (L)      Hematocrit Latest Ref Range: 36 - 46 % 35.7 (L)      MCV Latest Ref Range: 80 - 100 fL 85.6      MCH Latest Ref Range: 26 - 34 pg 28.2      MCHC Latest Ref Range: 31 - 37 g/dL 32.9      MPV Latest Ref Range: 6.0 - 12.0 fL 9.3      RDW Latest Ref Range: 11.5 - 14.5 % 15.5 (H)      Platelet Count Latest Ref Range: 130 - 400 k/uL 272      Platelet Estimate Unknown NOT REPORTED      Absolute Mono # Latest Ref Range: 0.2 - 0.8 k/uL 0.60      Eosinophils % Latest Ref Range: 1 - 4 % 2      Basophils # Latest Ref Range: 0.0 - 0.2 k/uL 0.00      Differential Type Unknown NOT REPORTED      Seg Neutrophils Latest Ref Range: 36 - 66 % 65      Segs Absolute Latest Ref Range: 1.8 - 7.7 k/uL 4.80      Lymphocytes Latest Ref Range: 24 - 44 % 24      Absolute Lymph # Latest Ref Range: 1.0 - 4.8 k/uL 1.80      Monocytes Latest Ref Range: 1 - 7 % 9 (H)      Absolute Eos # Latest Ref Range: 0.0 - 0.4 k/uL 0.10      Basophils Latest Ref Range: 0 - 2 % 0      Immature Granulocytes Latest Ref Range: 0 % NOT REPORTED      WBC Morphology Unknown NOT REPORTED      RBC Morphology Unknown NOT REPORTED      Results for Lindsey Kayser (MRN [id number]) as of 11/22/2017 04:49   Ref.  Range 11/22/2017 03:44 11/22/2017 03:45 11/22/2017 03:47   Date, Stool #1 Unknown   11,[phone]   Date, Stool #2 Unknown   NOT REPORTED   Date, Stool #3 Unknown   NOT REPORTED   Occult Blood, Stool #1 Latest Ref Range: NEG    NEGATIVE   Occult Blood, Stool #2 Latest Ref Range: NEG    NOT REPORTED   Occult Blood, Stool #3 Latest Ref Range: NEG    NOT REPORTED   ROTAVIRUS ANTIGEN, STOOL Unknown   Rpt   Time, Stool #1 Unknown   350   Time, Stool #2 Unknown   NOT REPORTED   Time, Stool #3 Unknown   NOT REPORTED   CULTURE STOOL Unknown   Rpt   BLOOD OCCULT STOOL SCREEN #1 Unknown   Rpt   C DIFF TOXIN B BY RT PCR Unknown   Rpt   GIARDIA ANTIGEN Unknown   Rpt   Color, UA Latest Ref Range: YEL  YELLOW recognition program.  Efforts were made to edit the dictations but occasionally words are mis-transcribed.)    Robert Lan MD  Attending Emergency Physician         Robert Lan MD  11/22/17 9127

## 2017-11-22 NOTE — ED NOTES
Dr. Jonathan Higginbotham spoke with Dr. Indra Carnes and he would like to treat with just fluids at this time before starting dopamine.       Nabeel Reyes RN  11/22/17 3128

## 2017-11-22 NOTE — CONSULTS
Inpatient consult to Critical Care  Consult performed by: Nic Ramirez  Consult ordered by: Jayshree Guerra         Pulmonary Medicine and [address] Sondra Milligan MD      Patient - Lukas Ghotra   MRN -  [id number]   [place] # - [de-identified]   - 1970      Date of Admission -  2017  3:07 AM  Date of evaluation -  2017  Room - [address] [place] [address] P Blood, DO Primary Care Physician - Citlaly Calhoun CNP     Reason for Consult    Hypotension    Assessment   · Nausea/vomiting/abdominal pain/diarrhea mostly secondary to viral gastroenteritis  · Hypotension/dehydration  · Suspected obstructive sleep apnea/Obesity    Recommendations   · Continue IV Fluids  · Check serum cortisol level  · Dopamine drip when necessary  · Echocardiogram  · Sleep studies as an outpatient  · X-ray chest in am  · Labs: CBC and BMP in am  · DVT prophylaxis with low molecular weight heparin  · Will follow with you    Problem List      Patient Active Problem List   Diagnosis    Anemia    Hypotension    FARIDA [facility]acute [facility]) ([facility] [address]    Dehydration    Gastroenteritis       HPI     Lukas Ghotra is 50 y.o.,  female, admitted because of nausea vomiting and abdominal pain. She was also noted to be hypotensive. She has given a total of 5 L of saline and now she is on the 125 ML's of normal saline. She is feeling slightly better. She is tolerating orals. She does not have any vomiting but still is carrying mild nausea. She is a nonsmoker. She snores and has been told to have apneas during asleep. She has not refreshing sleep. She does feel fatigued and tired in the daytime. She has been told to be checked for sleep apnea.   PMHx   Past Medical History      Diagnosis Date    Bronchitis     1 YR AGO    Constipation     Hypertension     Migraines     Pain     LOWER LEFT ABD    Rosacea     Urinary frequency     Uterine fibroid       Past Surgical History Component Value Date    ALKPHOS 41 11/22/2017    ALT 13 11/22/2017    AST 19 11/22/2017    PROT 7.1 11/22/2017    BILITOT 0.22 11/22/2017    BILIDIR <0.08 11/22/2017    IBILI CANNOT BE CALCULATED 11/22/2017    LABALBU 3.6 11/22/2017     Radiology    CT Scans    (See actual reports for details)    \"Thank you for asking us to see this patient\"    Case discussed with nurse and patient. Questions and concerns addressed.     Electronically signed by     Ezequiel Barroso MD on 11/22/2017 at 2:36 PM  Pulmonary Critical Care and Sleep Medicine,  [facility]  Cell: [phone]  Office: [phone]

## 2017-11-23 ENCOUNTER — APPOINTMENT (OUTPATIENT)
Dept: GENERAL RADIOLOGY | Age: 47
DRG: 684 | End: 2017-11-23
Payer: COMMERCIAL

## 2017-11-23 LAB
ABSOLUTE EOS #: 0.1 K/UL (ref 0–0.4)
ABSOLUTE IMMATURE GRANULOCYTE: ABNORMAL K/UL (ref 0–0.3)
ABSOLUTE LYMPH #: 1.7 K/UL (ref 1–4.8)
ABSOLUTE MONO #: 0.4 K/UL (ref 0.2–0.8)
ANION GAP SERPL CALCULATED.3IONS-SCNC: 10 MMOL/L (ref 9–17)
BASOPHILS # BLD: 0 % (ref 0–2)
BASOPHILS ABSOLUTE: 0 K/UL (ref 0–0.2)
BUN BLDV-MCNC: 15 MG/DL (ref 6–20)
BUN/CREAT BLD: 14 (ref 9–20)
CALCIUM SERPL-MCNC: 7.5 MG/DL (ref 8.6–10.4)
CHLORIDE BLD-SCNC: 109 MMOL/L (ref 98–107)
CO2: 23 MMOL/L (ref 20–31)
CREAT SERPL-MCNC: 1.11 MG/DL (ref 0.5–0.9)
CULTURE: NORMAL
CULTURE: NORMAL
DIFFERENTIAL TYPE: ABNORMAL
EOSINOPHILS RELATIVE PERCENT: 2 % (ref 1–4)
GFR AFRICAN AMERICAN: >60 ML/MIN
GFR NON-AFRICAN AMERICAN: 52 ML/MIN
GFR SERPL CREATININE-BSD FRML MDRD: ABNORMAL ML/MIN/{1.73_M2}
GFR SERPL CREATININE-BSD FRML MDRD: ABNORMAL ML/MIN/{1.73_M2}
GLUCOSE BLD-MCNC: 90 MG/DL (ref 70–99)
HCT VFR BLD CALC: 26.7 % (ref 36–46)
HEMOGLOBIN: 8.7 G/DL (ref 12–16)
IMMATURE GRANULOCYTES: ABNORMAL %
LYMPHOCYTES # BLD: 31 % (ref 24–44)
Lab: NORMAL
MCH RBC QN AUTO: 28.3 PG (ref 26–34)
MCHC RBC AUTO-ENTMCNC: 32.4 G/DL (ref 31–37)
MCV RBC AUTO: 87.3 FL (ref 80–100)
MONOCYTES # BLD: 7 % (ref 1–7)
PDW BLD-RTO: 15.7 % (ref 11.5–14.5)
PLATELET # BLD: 195 K/UL (ref 130–400)
PLATELET ESTIMATE: ABNORMAL
PMV BLD AUTO: 9 FL (ref 6–12)
POTASSIUM SERPL-SCNC: 3.9 MMOL/L (ref 3.7–5.3)
RBC # BLD: 3.06 M/UL (ref 4–5.2)
RBC # BLD: ABNORMAL 10*6/UL
SEG NEUTROPHILS: 60 % (ref 36–66)
SEGMENTED NEUTROPHILS ABSOLUTE COUNT: 3.2 K/UL (ref 1.8–7.7)
SODIUM BLD-SCNC: 142 MMOL/L (ref 135–144)
SPECIMEN DESCRIPTION: NORMAL
SPECIMEN DESCRIPTION: NORMAL
STATUS: NORMAL
WBC # BLD: 5.4 K/UL (ref 3.5–11)
WBC # BLD: ABNORMAL 10*3/UL

## 2017-11-23 PROCEDURE — 2580000003 HC RX 258: Performed by: INTERNAL MEDICINE

## 2017-11-23 PROCEDURE — 85025 COMPLETE CBC W/AUTO DIFF WBC: CPT

## 2017-11-23 PROCEDURE — 80048 BASIC METABOLIC PNL TOTAL CA: CPT

## 2017-11-23 PROCEDURE — 2580000003 HC RX 258: Performed by: NURSE PRACTITIONER

## 2017-11-23 PROCEDURE — G8980 MOBILITY D/C STATUS: HCPCS

## 2017-11-23 PROCEDURE — 99232 SBSQ HOSP IP/OBS MODERATE 35: CPT | Performed by: INTERNAL MEDICINE

## 2017-11-23 PROCEDURE — 97161 PT EVAL LOW COMPLEX 20 MIN: CPT

## 2017-11-23 PROCEDURE — 71010 XR CHEST PORTABLE: CPT

## 2017-11-23 PROCEDURE — G8979 MOBILITY GOAL STATUS: HCPCS

## 2017-11-23 PROCEDURE — 36415 COLL VENOUS BLD VENIPUNCTURE: CPT

## 2017-11-23 PROCEDURE — 2000000000 HC ICU R&B

## 2017-11-23 PROCEDURE — 6360000002 HC RX W HCPCS: Performed by: NURSE PRACTITIONER

## 2017-11-23 PROCEDURE — 97110 THERAPEUTIC EXERCISES: CPT

## 2017-11-23 PROCEDURE — G8978 MOBILITY CURRENT STATUS: HCPCS

## 2017-11-23 PROCEDURE — 6370000000 HC RX 637 (ALT 250 FOR IP): Performed by: INTERNAL MEDICINE

## 2017-11-23 RX ORDER — MIDODRINE HYDROCHLORIDE 10 MG/1
5 TABLET ORAL ONCE
Status: COMPLETED | OUTPATIENT
Start: 2017-11-23 | End: 2017-11-23

## 2017-11-23 RX ADMIN — SODIUM CHLORIDE: 9 INJECTION, SOLUTION INTRAVENOUS at 08:02

## 2017-11-23 RX ADMIN — Medication 10 ML: at 20:55

## 2017-11-23 RX ADMIN — ONDANSETRON 4 MG: 2 INJECTION INTRAMUSCULAR; INTRAVENOUS at 08:02

## 2017-11-23 RX ADMIN — ENOXAPARIN SODIUM 40 MG: 40 INJECTION SUBCUTANEOUS at 08:02

## 2017-11-23 RX ADMIN — MIDODRINE HYDROCHLORIDE 5 MG: 10 TABLET ORAL at 19:30

## 2017-11-23 RX ADMIN — SODIUM CHLORIDE: 9 INJECTION, SOLUTION INTRAVENOUS at 15:51

## 2017-11-23 ASSESSMENT — PAIN SCALES - GENERAL
PAINLEVEL_OUTOF10: 0

## 2017-11-23 NOTE — PLAN OF CARE
[facility]    Second Visit Note  For more detailed information please refer to the progress note of the day      11/23/2017    6:21 PM    Name:   Barbara Alves  MRN:     [id number]     [place]:      [de-identified]   Room:   2034/2034-01   Day:  1  Admit Date:  11/22/2017  3:07 AM    PCP:   Tariq Bravo CNP  Code Status:  Full Code        Pt vitals were reviewed   New labs were reviewed   Patient was seen    Updated plan :     1. Pt resting comfortably  2. bp not really coming up-will give 1 dose of proamitine to se if this will boost bp        Denese Krabbe Blood, DO  11/23/2017  6:21 PM

## 2017-11-24 VITALS
TEMPERATURE: 98.1 F | WEIGHT: 200 LBS | HEART RATE: 88 BPM | DIASTOLIC BLOOD PRESSURE: 65 MMHG | SYSTOLIC BLOOD PRESSURE: 110 MMHG | OXYGEN SATURATION: 95 % | RESPIRATION RATE: 18 BRPM | HEIGHT: 65 IN | BODY MASS INDEX: 33.32 KG/M2

## 2017-11-24 LAB
ANION GAP SERPL CALCULATED.3IONS-SCNC: 12 MMOL/L (ref 9–17)
BUN BLDV-MCNC: 8 MG/DL (ref 6–20)
BUN/CREAT BLD: 9 (ref 9–20)
CALCIUM SERPL-MCNC: 7.8 MG/DL (ref 8.6–10.4)
CHLORIDE BLD-SCNC: 109 MMOL/L (ref 98–107)
CO2: 21 MMOL/L (ref 20–31)
CREAT SERPL-MCNC: 0.92 MG/DL (ref 0.5–0.9)
GFR AFRICAN AMERICAN: >60 ML/MIN
GFR NON-AFRICAN AMERICAN: >60 ML/MIN
GFR SERPL CREATININE-BSD FRML MDRD: ABNORMAL ML/MIN/{1.73_M2}
GFR SERPL CREATININE-BSD FRML MDRD: ABNORMAL ML/MIN/{1.73_M2}
GLUCOSE BLD-MCNC: 86 MG/DL (ref 70–99)
POTASSIUM SERPL-SCNC: 3.9 MMOL/L (ref 3.7–5.3)
SODIUM BLD-SCNC: 142 MMOL/L (ref 135–144)

## 2017-11-24 PROCEDURE — 80048 BASIC METABOLIC PNL TOTAL CA: CPT

## 2017-11-24 PROCEDURE — 36415 COLL VENOUS BLD VENIPUNCTURE: CPT

## 2017-11-24 PROCEDURE — 99232 SBSQ HOSP IP/OBS MODERATE 35: CPT | Performed by: INTERNAL MEDICINE

## 2017-11-24 ASSESSMENT — PAIN SCALES - GENERAL
PAINLEVEL_OUTOF10: 0
PAINLEVEL_OUTOF10: 0

## 2017-11-24 NOTE — PROGRESS NOTES
Patient discharged per wheelchair to private vehicle with documented belongings. Discharge paperwork given and discussed, questions and concerns answered to the best of writers ability.
Pulmonary Critical Care Progress Note  Haider Umanzor MD     Patient seen for the follow up of Hypotension, dehydration, nausea vomiting diarrhea, FARIDA (acute kidney injury) ([address]     Subjective:  She denies chest pain. She denies cough. She denies shortness of breath     Examination:  Vitals: BP (!) 84/59   Pulse 79   Temp 98.4 °F (36.9 °C) (Temporal)   Resp 16   Ht 5' 5\" (1.651 m)   Wt 200 lb (90.7 kg)   LMP 05/03/2013   SpO2 96%   BMI 33.28 kg/m²   General appearance: alert and cooperative with exam  Neck: No JVD  Lungs: clear to auscultation bilaterally  Heart: regular rate and rhythm, S1, S2 normal, no gallop  Abdomen: Soft, non tender, + BS  Extremities: no cyanosis or clubbing. No significant edema    LABs:  CBC:   Recent Labs      11/22/17   0638  11/23/17   0525   WBC  6.5  5.4   HGB  10.7*  8.7*   HCT  32.4*  26.7*   PLT  225  195     BMP:   Recent Labs      11/22/17   0731  11/23/17   0525   NA  141  142   K  5.0  3.9   CO2  20  23   BUN  30*  15   CREATININE  1.63*  1.11*   LABGLOM  34*  52*   GLUCOSE  92  90     LIVER PROFILE:  Recent Labs      11/22/17   0315  11/22/17   0731   AST  19  19   ALT  15  13   LABALBU  4.3  3.6     Radiology:  11/23/17      Impression:  · Hypotension/dehydration  · Nausea/vomiting/abdominal pain/diarrhea  · Obesity/ suspected obstructive sleep apnea  · Serum cortisol: 4.5 with hypotension,?   Relative deficiency   ·  FARIDA    Recommendations:  · Continue IV  Fluids  · If she remains hypotensive in spite of adequate hydration will consider IV solu-medrol ×3 doses   · Albuterol and Ipratropium Q 4 hours and prn  · Dulera 200  · Labs: CBC and BMP in am  · 2 liters/min via nasal cannula  · Sleep apnea evaluation as an outpatient   · DVT prophylaxis with low molecular weight heparin  · Will follow with you    Haider Umanzor MD, [facility]  Pulmonary Critical Care and Sleep Medicine,  [facility]  Cell: [phone]  Office: [phone]
MYOGLOBIN   --    --   <21*   --      Recent Labs      11/22/17   0315  11/22/17   0318  11/22/17   0731   PROT  8.2   --   7.1   LABALBU  4.3   --   3.6   AST  19   --   19   ALT  15   --   13   ALKPHOS  46   --   41   BILITOT  0.39   --   0.22*   BILIDIR  <0.08   --    --    AMYLASE  73   --    --    LIPASE  13   --    --    POCGLU   --   104   --          Lab Results   Component Value Date/Time    SPECIAL NOT REPORTED 11/22/2017 03:48 AM    SPECIAL NOT REPORTED 11/22/2017 03:48 AM     Lab Results   Component Value Date/Time    CULTURE NO SIGNIFICANT GROWTH 11/22/2017 03:45 AM    CULTURE  11/22/2017 03:45 AM     Performed at [facility], [address] [place] [address] [phone]       No results found for: POCPH, PHART, PH, POCPCO2, JMR7PXT, PCO2, POCPO2, PO2ART, PO2, POCHCO3, DZL8ZLH, HCO3, NBEA, PBEA, BEART, BE, THGBART, THB, WEN7USQ, TJWC6LNC, R9AKBZOX, O2SAT, FIO2    Radiology:    cxr:     Impression   1. No acute cardiopulmonary disease. Physical Examination:        General appearance:  alert, cooperative and no distress  Mental Status:  oriented to person, place and time and normal affect  Lungs:  clear to auscultation bilaterally, normal effort  Heart:  regular rate and rhythm, no murmur  Abdomen:  soft, nontender, nondistended, normal bowel sounds, no masses, hepatomegaly, splenomegaly, obese  Extremities:  no edema, redness, tenderness in the calves  Skin:  no gross lesions, rashes, induration    Assessment:        Primary Problem  FARIDA (acute kidney injury) [facility])    VASHTI[facility] Problems    Diagnosis Date Noted    Hypotension [I95.9] 11/22/2017    FARIDA (acute kidney injury) ([facility] [N17.9] 11/22/2017    Dehydration [E86.0] 11/22/2017    Gastroenteritis [K52.9] 11/22/2017       Plan:        1. Cont ivf  2. D/w dr Sara Porras today; may need solucortef if bp does not respond  3. Renal fcn better  4.  Not ready for dc this am    Brad Torres Blood, DO  11/23/2017  12:46 PM

## 2017-11-24 NOTE — DISCHARGE SUMMARY
[facility]    Discharge Summary     Patient ID: Cally Cordova  :  1970   MRN: [id number]     ACCOUNT:  [de-identified]   Patient's PCP: Callum Kingston CNP  Admit Date: 2017   Discharge Date: 2017     Length of Stay: 2  Code Status:  Full Code  Admitting Physician: Jonathan Gomes,   Discharge Physician: Jonathan Gomes DO     Active Discharge Diagnoses:     Primary Problem  FARIDA (acute kidney injury) [facility]      [facility] Problems    Diagnosis Date Noted    Hypotension [I95.9] 2017    FARIDA (acute kidney injury) ([facility] [N17.9] 2017    Dehydration [E86.0] 2017    Gastroenteritis [K52.9] 2017       Admission Condition:  fair     Discharged Condition: good    Hospital Stay:     Hospital Course:  Cally Cordova is a 50 y.o. female who was admitted for the management of   FARIDA (acute kidney injury) ([facility] , presented to ER with Abdominal Pain; Emesis; and Diarrhea    Admitted with severe hypotension with consequent farida and had gastroenteritis symptoms. Had diarrhea, which tested neg on culture and c dif. Very dehydrated-given profuse ivf and bp started to respond. bp 110/65 upon dc.     Will be kept off htn meds due to low bp; also off metformin as just recovering from gi illness    Significant therapeutic interventions: see above    Significant Diagnostic Studies:   Labs / Micro:  CBC:   Lab Results   Component Value Date    WBC 5.4 2017    RBC 3.06 2017    HGB 8.7 2017    HCT 26.7 2017    MCV 87.3 2017    MCH 28.3 2017    MCHC 32.4 2017    RDW 15.7 2017     2017     CMP:    Lab Results   Component Value Date    GLUCOSE 86 2017     2017    K 3.9 2017     2017    CO2 21 2017    BUN 8 2017    CREATININE 0.92 2017    ANIONGAP 12 2017    ALKPHOS 41 2017    ALT 13 sidewall lymphadenopathy. Peritoneum/Retroperitoneum: Atherosclerotic plaque is noted in the aorta and its branch vessels. No retroperitoneal adenopathy. No anterior abdominal wall defect. Bones/Soft Tissues: There is no appreciable soft tissue swelling identified. Degenerative changes are noted in the spine. 1. There is fluid-filled bowel, compatible with the patient's clinical history of diarrhea. 2. Indeterminate low-attenuation lesion in the right lobe of the liver, measuring approximately 3.7 x 3.2 cm, which is of uncertain etiology. Dedicated MRI of the liver without and with intravenous contrast could be performed for further evaluation if clinically indicated. 3. Atherosclerotic disease. Xr Chest Portable    Result Date: 11/23/2017  EXAMINATION: SINGLE VIEW OF THE CHEST 11/23/2017 6:00 am COMPARISON: 02/17/2011. HISTORY: ORDERING SYSTEM PROVIDED HISTORY: infiltrate TECHNOLOGIST PROVIDED HISTORY: Reason for exam:->infiltrate Ordering Physician Provided Reason for Exam: congestion Acuity: Unknown Type of Exam: Subsequent/Follow-up Acute. Initial evaluation. FINDINGS: The cardiac silhouette and mediastinal contours are normal.  The lungs are clear. No parenchymal lung infiltrate. No pleural effusion. The visualized osseous structures are unremarkable. 1. No acute cardiopulmonary disease. Us Retroperitoneal Limited    Result Date: 11/22/2017  EXAMINATION: RETROPERITONEAL ULTRASOUND OF THE KIDNEYS AND URINARY BLADDER 11/22/2017 COMPARISON: None HISTORY: ORDERING SYSTEM PROVIDED HISTORY: raiza FINDINGS: Kidneys: The right kidney measures 8.8 cm in length and the left kidney measures 8.8 cm in length. Kidneys demonstrate normal cortical echogenicity. No evidence of hydronephrosis or intrarenal stones. Bladder: Not imaged     Mildly atrophic kidneys.          Consultations:    Consults:     Final Specialist Recommendations/Findings:   IP CONSULT TO INTERNAL MEDICINE  IP CONSULT TO CRITICAL CARE

## 2018-04-12 PROBLEM — E86.0 DEHYDRATION: Status: RESOLVED | Noted: 2017-11-22 | Resolved: 2018-04-12

## 2023-02-28 ENCOUNTER — APPOINTMENT (OUTPATIENT)
Dept: GENERAL RADIOLOGY | Age: 53
End: 2023-02-28
Payer: OTHER MISCELLANEOUS

## 2023-02-28 ENCOUNTER — HOSPITAL ENCOUNTER (EMERGENCY)
Age: 53
Discharge: HOME | End: 2023-02-28
Attending: EMERGENCY MEDICINE
Payer: OTHER MISCELLANEOUS

## 2023-02-28 VITALS
WEIGHT: 190 LBS | HEART RATE: 78 BPM | HEIGHT: 65 IN | BODY MASS INDEX: 31.65 KG/M2 | RESPIRATION RATE: 16 BRPM | OXYGEN SATURATION: 99 % | SYSTOLIC BLOOD PRESSURE: 118 MMHG | TEMPERATURE: 99 F | DIASTOLIC BLOOD PRESSURE: 75 MMHG
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
## 2023-02-28 DIAGNOSIS — M25.532 LEFT WRIST PAIN: ICD-10-CM

## 2023-02-28 DIAGNOSIS — S39.012A STRAIN OF LUMBAR REGION, INITIAL ENCOUNTER: Primary | ICD-10-CM

## 2023-02-28 DIAGNOSIS — S16.1XXA ACUTE STRAIN OF NECK MUSCLE, INITIAL ENCOUNTER: ICD-10-CM

## 2023-02-28 PROCEDURE — 73110 X-RAY EXAM OF WRIST: CPT

## 2023-02-28 PROCEDURE — 96372 THER/PROPH/DIAG INJ SC/IM: CPT

## 2023-02-28 PROCEDURE — 72040 X-RAY EXAM NECK SPINE 2-3 VW: CPT

## 2023-02-28 PROCEDURE — 6360000002 HC RX W HCPCS: Performed by: PHYSICIAN ASSISTANT

## 2023-02-28 PROCEDURE — 99284 EMERGENCY DEPT VISIT MOD MDM: CPT

## 2023-02-28 PROCEDURE — 72100 X-RAY EXAM L-S SPINE 2/3 VWS: CPT

## 2023-02-28 RX ORDER — IBUPROFEN 800 MG/1
800 TABLET ORAL EVERY 8 HOURS PRN
Qty: 21 TABLET | Refills: 0 | Status: SHIPPED | OUTPATIENT
Start: 2023-02-28 | End: 2023-02-28 | Stop reason: SDUPTHER

## 2023-02-28 RX ORDER — KETOROLAC TROMETHAMINE 15 MG/ML
15 INJECTION, SOLUTION INTRAMUSCULAR; INTRAVENOUS ONCE
Status: COMPLETED | OUTPATIENT
Start: 2023-02-28 | End: 2023-02-28

## 2023-02-28 RX ORDER — CYCLOBENZAPRINE HCL 10 MG
10 TABLET ORAL 3 TIMES DAILY PRN
Qty: 12 TABLET | Refills: 0 | Status: SHIPPED | OUTPATIENT
Start: 2023-02-28 | End: 2023-02-28 | Stop reason: SDUPTHER

## 2023-02-28 RX ORDER — CYCLOBENZAPRINE HCL 10 MG
10 TABLET ORAL 3 TIMES DAILY PRN
Qty: 12 TABLET | Refills: 0 | Status: SHIPPED | OUTPATIENT
Start: 2023-02-28 | End: 2023-03-12

## 2023-02-28 RX ORDER — IBUPROFEN 800 MG/1
800 TABLET ORAL EVERY 8 HOURS PRN
Qty: 21 TABLET | Refills: 0 | Status: SHIPPED | OUTPATIENT
Start: 2023-02-28

## 2023-02-28 RX ADMIN — KETOROLAC TROMETHAMINE 15 MG: 15 INJECTION, SOLUTION INTRAMUSCULAR; INTRAVENOUS at 20:33

## 2023-02-28 ASSESSMENT — PAIN DESCRIPTION - FREQUENCY: FREQUENCY: CONTINUOUS

## 2023-02-28 ASSESSMENT — PAIN - FUNCTIONAL ASSESSMENT: PAIN_FUNCTIONAL_ASSESSMENT: 0-10

## 2023-02-28 ASSESSMENT — ENCOUNTER SYMPTOMS
COUGH: 0
EYE PAIN: 0
RHINORRHEA: 0
WHEEZING: 0
COLOR CHANGE: 0
BACK PAIN: 1
EYE DISCHARGE: 0
SORE THROAT: 0
EYE ITCHING: 0
NAUSEA: 0
VOMITING: 0

## 2023-02-28 ASSESSMENT — PAIN SCALES - GENERAL: PAINLEVEL_OUTOF10: 8

## 2023-02-28 ASSESSMENT — PAIN DESCRIPTION - DESCRIPTORS: DESCRIPTORS: THROBBING

## 2023-02-28 ASSESSMENT — PAIN DESCRIPTION - LOCATION: LOCATION: WRIST;BACK

## 2023-02-28 ASSESSMENT — PAIN DESCRIPTION - ORIENTATION: ORIENTATION: LEFT

## 2023-02-28 NOTE — LETTER
[facility] ED  [address] [address]  Phone: [phone]             March 3, 2023    Patient: Homer Barros   YOB: 1970   Date of Visit: 2/28/2023       To Whom It May Concern:    Nik Mcmullen was seen and treated in our emergency department on 2/28/2023 and will follow up with primary care physician.        Sincerely,             Signature:__________________________________

## 2023-02-28 NOTE — Clinical Note
Glenn Arredondo was seen and treated in our emergency department on 2/28/2023. She may return to work on 03/03/2023. If you have any questions or concerns, please don't hesitate to call.       Norma Roberts PA-C
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
## 2023-03-01 NOTE — DISCHARGE INSTRUCTIONS
Rest.  No heavy lifting. No driving or working while taking flexeril as it may make you sleepy    Please return to the ED for increased pain, numbness, tingling, weakness, bladder or bowel dysfunction or other concerns deemed emergent.
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
## 2023-09-07 ENCOUNTER — APPOINTMENT (OUTPATIENT)
Dept: GENERAL RADIOLOGY | Age: 53
End: 2023-09-07
Payer: COMMERCIAL

## 2023-09-07 ENCOUNTER — HOSPITAL ENCOUNTER (EMERGENCY)
Age: 53
Discharge: HOME | End: 2023-09-07
Attending: EMERGENCY MEDICINE
Payer: COMMERCIAL

## 2023-09-07 VITALS
DIASTOLIC BLOOD PRESSURE: 68 MMHG | HEART RATE: 91 BPM | BODY MASS INDEX: 31.65 KG/M2 | HEIGHT: 65 IN | RESPIRATION RATE: 18 BRPM | TEMPERATURE: 97.5 F | OXYGEN SATURATION: 100 % | WEIGHT: 190 LBS | SYSTOLIC BLOOD PRESSURE: 115 MMHG

## 2023-09-07 DIAGNOSIS — S93.401A SPRAIN OF RIGHT ANKLE, UNSPECIFIED LIGAMENT, INITIAL ENCOUNTER: Primary | ICD-10-CM

## 2023-09-07 PROCEDURE — 99284 EMERGENCY DEPT VISIT MOD MDM: CPT

## 2023-09-07 PROCEDURE — 73610 X-RAY EXAM OF ANKLE: CPT

## 2023-09-07 PROCEDURE — 73630 X-RAY EXAM OF FOOT: CPT

## 2023-09-07 PROCEDURE — 96372 THER/PROPH/DIAG INJ SC/IM: CPT

## 2023-09-07 PROCEDURE — 6360000002 HC RX W HCPCS: Performed by: PHYSICIAN ASSISTANT

## 2023-09-07 RX ORDER — ETODOLAC 500 MG/1
500 TABLET, FILM COATED ORAL 2 TIMES DAILY
Qty: 20 TABLET | Refills: 0 | Status: SHIPPED | OUTPATIENT
Start: 2023-09-07 | End: 2023-09-17

## 2023-09-07 RX ORDER — KETOROLAC TROMETHAMINE 30 MG/ML
30 INJECTION, SOLUTION INTRAMUSCULAR; INTRAVENOUS ONCE
Status: COMPLETED | OUTPATIENT
Start: 2023-09-07 | End: 2023-09-07

## 2023-09-07 RX ADMIN — KETOROLAC TROMETHAMINE 30 MG: 30 INJECTION, SOLUTION INTRAMUSCULAR at 14:06

## 2023-09-07 ASSESSMENT — PAIN - FUNCTIONAL ASSESSMENT: PAIN_FUNCTIONAL_ASSESSMENT: 0-10

## 2023-09-07 ASSESSMENT — PAIN SCALES - GENERAL: PAINLEVEL_OUTOF10: 9

## 2023-09-07 NOTE — ED PROVIDER NOTES
eMERGENCY dEPARTMENT eNCOUnter   [address] Name: Daryl Flood  MRN: [id number]  [address] [facility] 1970  Date of evaluation: 9/7/23     Daryl Flood is a 52 y.o. female with CC: Ankle Pain ((R) denies injury)        This visit was performed by both a physician and an APC. I performed all aspects of the MDM as documented.       Pete Valdez MD  Attending Emergency Physician            Pete Valdez MD  70/71/40 4924

## 2023-09-12 ENCOUNTER — OFFICE VISIT (OUTPATIENT)
Dept: PODIATRY | Age: 53
End: 2023-09-12
Payer: COMMERCIAL

## 2023-09-12 VITALS — BODY MASS INDEX: 34.99 KG/M2 | HEIGHT: 65 IN | WEIGHT: 210 LBS

## 2023-09-12 DIAGNOSIS — M25.571 ACUTE RIGHT ANKLE PAIN: Primary | ICD-10-CM

## 2023-09-12 DIAGNOSIS — S93.491A SPRAIN OF ANTERIOR TALOFIBULAR LIGAMENT OF RIGHT ANKLE, INITIAL ENCOUNTER: ICD-10-CM

## 2023-09-12 PROCEDURE — 99204 OFFICE O/P NEW MOD 45 MIN: CPT | Performed by: PODIATRIST

## 2023-09-12 PROCEDURE — L4360 PNEUMAT WALKING BOOT PRE CST: HCPCS | Performed by: PODIATRIST

## 2023-09-12 RX ORDER — LORATADINE 10 MG/1
TABLET ORAL DAILY
COMMUNITY
Start: 2020-01-08

## 2023-09-12 RX ORDER — PREDNISONE 10 MG/1
TABLET ORAL
Qty: 20 TABLET | Refills: 0 | Status: SHIPPED | OUTPATIENT
Start: 2023-09-12

## 2023-09-12 NOTE — PROGRESS NOTES
15 seconds. Relax and put your weight back onto your uninjured foot. Full Weight-Bearing Lateral Stepping     Increase the speed of this exercise as your healing progresses:   Place a rolled towel or short object on the ground to the side of your injured foot. Step over the towel with the injured foot and remain on that foot. Then bring the uninjured foot over the object and stand on both feet. Step back over the towel with the uninjured foot and remain on that foot. Then bring the injured foot back over the towel and stand on both feet. Full Weight-Bearing Lateral Jump     This exercise starts to incorporate plyometrics into your rehab routine, which can help you get back to running and sports. Increase the speed of this exercise as your healing progresses:   Place a rolled towel or short object on the ground to the side of your injured foot. Hop over the towel and land on the injured foot. Then hop back over the towel and land on the uninjured foot. Single Leg Stance on a Towel     Injury to ankles can often result in decreased balance ability. 2? Towards the end of rehabilitation, performing balance activities is an important way to prevent future injury. Perform this exercise 10 times in a row: Fold a towel into a small rectangle and place on the ground. Stand with the injured foot on the towel. Lift the uninjured leg off the ground standing only on the towel with the injured leg. Hold for 15 seconds. (As balance improves, increase stance time on injured leg up to 45 seconds.)  Return your uninjured foot to the floor. You can increase the challenge by standing on more unsteady surfaces like a BOSU or wobble board. Your PT may also have you use a BAPS board while working on balance exercises. Rx provided for tapered steroid     All labs were reviewed and all imagining including the above findings were reviewed PRIOR to the patients arrival and with the patient today.     Previous patient

## 2023-09-13 ENCOUNTER — TELEPHONE (OUTPATIENT)
Dept: ADMINISTRATIVE | Age: 53
End: 2023-09-13

## 2023-09-13 NOTE — TELEPHONE ENCOUNTER
At 2:18 pm I received  a Teams message from Richard Logan stating the pt called back regarding the first message sent. States she tried calling the office but did not get an answer and to call pt back. I immediately called pt again and she did not answer and I was not able to leave a message. Please advise. I double checked medication list from office visit on 9/12/23. Etodolac is marked as not taking and ibuprofen is marked as taking. I called pt for clarification but she did not answer and I was not able to leave voicemail due to it being full. Did you want the pt to take Etodolac? I noted prednisone prescribed.

## 2023-09-13 NOTE — TELEPHONE ENCOUNTER
Pt called stating she has not been taking Etodolac d/t it causing swelling of her face. She would like to know if she could be given Ibuprofen 800 for pain. She uses [facility] on [address] [place]. Please let pt know.

## 2023-09-26 ENCOUNTER — OFFICE VISIT (OUTPATIENT)
Dept: PODIATRY | Age: 53
End: 2023-09-26
Payer: COMMERCIAL

## 2023-09-26 VITALS — HEIGHT: 65 IN | WEIGHT: 210 LBS | BODY MASS INDEX: 34.99 KG/M2

## 2023-09-26 DIAGNOSIS — S93.491A SPRAIN OF ANTERIOR TALOFIBULAR LIGAMENT OF RIGHT ANKLE, INITIAL ENCOUNTER: ICD-10-CM

## 2023-09-26 DIAGNOSIS — M25.571 ACUTE RIGHT ANKLE PAIN: Primary | ICD-10-CM

## 2023-09-26 PROCEDURE — 99213 OFFICE O/P EST LOW 20 MIN: CPT | Performed by: PODIATRIST

## 2023-09-26 NOTE — PROGRESS NOTES
weight back onto your uninjured foot. Full Weight-Bearing Lateral Stepping     Increase the speed of this exercise as your healing progresses:   Place a rolled towel or short object on the ground to the side of your injured foot. Step over the towel with the injured foot and remain on that foot. Then bring the uninjured foot over the object and stand on both feet. Step back over the towel with the uninjured foot and remain on that foot. Then bring the injured foot back over the towel and stand on both feet. Full Weight-Bearing Lateral Jump     This exercise starts to incorporate plyometrics into your rehab routine, which can help you get back to running and sports. Increase the speed of this exercise as your healing progresses:   Place a rolled towel or short object on the ground to the side of your injured foot. Hop over the towel and land on the injured foot. Then hop back over the towel and land on the uninjured foot. Single Leg Stance on a Towel     Injury to ankles can often result in decreased balance ability. 2? Towards the end of rehabilitation, performing balance activities is an important way to prevent future injury. Perform this exercise 10 times in a row: Fold a towel into a small rectangle and place on the ground. Stand with the injured foot on the towel. Lift the uninjured leg off the ground standing only on the towel with the injured leg. Hold for 15 seconds. (As balance improves, increase stance time on injured leg up to 45 seconds.)  Return your uninjured foot to the floor. You can increase the challenge by standing on more unsteady surfaces like a BOSU or wobble board. Your PT may also have you use a BAPS board while working on balance exercises. .  Verbal and written instructions given to patient. Contact office with any questions/problems/concerns. No orders of the defined types were placed in this encounter.     No orders of the defined types were placed in this

## 2024-01-21 ENCOUNTER — HOSPITAL ENCOUNTER (EMERGENCY)
Age: 54
Discharge: HOME | End: 2024-01-22
Attending: EMERGENCY MEDICINE
Payer: COMMERCIAL

## 2024-01-21 VITALS
SYSTOLIC BLOOD PRESSURE: 132 MMHG | BODY MASS INDEX: 31.65 KG/M2 | HEART RATE: 74 BPM | HEIGHT: 65 IN | OXYGEN SATURATION: 98 % | WEIGHT: 190 LBS | DIASTOLIC BLOOD PRESSURE: 82 MMHG | TEMPERATURE: 98.2 F | RESPIRATION RATE: 17 BRPM

## 2024-01-21 DIAGNOSIS — J02.9 ACUTE PHARYNGITIS, UNSPECIFIED ETIOLOGY: Primary | ICD-10-CM

## 2024-01-21 DIAGNOSIS — H10.31 ACUTE CONJUNCTIVITIS OF RIGHT EYE, UNSPECIFIED ACUTE CONJUNCTIVITIS TYPE: ICD-10-CM

## 2024-01-21 PROCEDURE — 99283 EMERGENCY DEPT VISIT LOW MDM: CPT

## 2024-01-21 PROCEDURE — 87651 STREP A DNA AMP PROBE: CPT

## 2024-01-21 ASSESSMENT — PAIN - FUNCTIONAL ASSESSMENT: PAIN_FUNCTIONAL_ASSESSMENT: 0-10

## 2024-01-21 ASSESSMENT — PAIN SCALES - GENERAL: PAINLEVEL_OUTOF10: 9

## 2024-01-22 LAB
SPECIMEN SOURCE: NORMAL
STREP A, MOLECULAR: NEGATIVE

## 2024-01-22 PROCEDURE — 6370000000 HC RX 637 (ALT 250 FOR IP): Performed by: EMERGENCY MEDICINE

## 2024-01-22 RX ORDER — AMOXICILLIN 500 MG/1
500 CAPSULE ORAL 3 TIMES DAILY
Qty: 21 CAPSULE | Refills: 0 | Status: SHIPPED | OUTPATIENT
Start: 2024-01-22 | End: 2024-01-29

## 2024-01-22 RX ORDER — AMOXICILLIN 500 MG/1
500 CAPSULE ORAL ONCE
Status: COMPLETED | OUTPATIENT
Start: 2024-01-22 | End: 2024-01-22

## 2024-01-22 RX ORDER — PREDNISONE 20 MG/1
60 TABLET ORAL ONCE
Status: COMPLETED | OUTPATIENT
Start: 2024-01-22 | End: 2024-01-22

## 2024-01-22 RX ORDER — GENTAMICIN SULFATE 3 MG/ML
2 SOLUTION/ DROPS OPHTHALMIC 4 TIMES DAILY
Qty: 5 ML | Refills: 0 | Status: SHIPPED | OUTPATIENT
Start: 2024-01-22 | End: 2024-02-01

## 2024-01-22 RX ORDER — PREDNISONE 50 MG/1
50 TABLET ORAL DAILY
Qty: 5 TABLET | Refills: 0 | Status: SHIPPED | OUTPATIENT
Start: 2024-01-22 | End: 2024-01-27

## 2024-01-22 RX ADMIN — PREDNISONE 60 MG: 20 TABLET ORAL at 01:00

## 2024-01-22 RX ADMIN — AMOXICILLIN 500 MG: 500 CAPSULE ORAL at 01:00

## 2024-01-22 ASSESSMENT — ENCOUNTER SYMPTOMS
NAUSEA: 0
VOMITING: 0
EYE REDNESS: 0
RHINORRHEA: 0
SHORTNESS OF BREATH: 0
DIARRHEA: 0
EYE DISCHARGE: 1
COLOR CHANGE: 0
COUGH: 0
SORE THROAT: 1

## 2024-01-22 NOTE — ED PROVIDER NOTES
MEDICATIONS       Previous Medications    ETODOLAC (LODINE) 500 MG TABLET    Take 1 tablet by mouth 2 times daily for 10 days    IBUPROFEN (IBU) 800 MG TABLET    Take 1 tablet by mouth every 8 hours as needed for Pain    LORATADINE (CLARITIN) 10 MG TABLET    Take by mouth daily    OLMESARTAN MEDOXOMIL-HCTZ (BENICAR HCT PO)    Take by mouth nightly     ALLERGIES     has No Known Allergies.  FAMILY HISTORY     She indicated that the status of her mother is unknown. She indicated that her maternal grandmother is .     SOCIAL HISTORY       Social History     Tobacco Use    Smoking status: Never     Passive exposure: Never    Smokeless tobacco: Never   Vaping Use    Vaping Use: Never used   Substance Use Topics    Alcohol use: Yes     Comment: occasional    Drug use: No     PHYSICAL EXAM     INITIAL VITALS: /82   Pulse 74   Temp 98.2 °F (36.8 °C) (Oral)   Resp 17   Ht 1.651 m (5' 5\")   Wt 86.2 kg (190 lb)   LMP 2013   SpO2 98%   BMI 31.62 kg/m²    Physical Exam  Constitutional:       Appearance: Normal appearance.   HENT:      Head: Normocephalic and atraumatic.      Mouth/Throat:      Pharynx: Posterior oropharyngeal erythema present. No pharyngeal swelling, oropharyngeal exudate or uvula swelling.      Tonsils: No tonsillar exudate.   Eyes:      Extraocular Movements: Extraocular movements intact.      Conjunctiva/sclera:      Right eye: Right conjunctiva is not injected. Exudate present. No chemosis or hemorrhage.     Left eye: Left conjunctiva is not injected. No chemosis, exudate or hemorrhage.     Pupils: Pupils are equal, round, and reactive to light.   Cardiovascular:      Rate and Rhythm: Normal rate and regular rhythm.   Pulmonary:      Effort: Pulmonary effort is normal.      Breath sounds: Normal breath sounds.   Abdominal:      General: Abdomen is flat.      Palpations: Abdomen is soft.      Tenderness: There is no abdominal tenderness.   Neurological:      Mental Status: She is

## 2024-02-02 NOTE — DISCHARGE INSTR - DIET
January 26, 2024     Patient: Sandro Florez   YOB: 1951   Date of Visit: 1/25/2024       To Whom It May Concern:    It is my medical opinion that Sandro Florez may return to work on 2/12/2024.    If you have any questions or concerns, please don't hesitate to call.         Sincerely,      Guillermo Ramirez MD     Good nutrition is important when healing from an illness, injury, or surgery. Follow any nutrition recommendations given to you during your hospital stay.  If you were given an oral nutrition supplement while in the hospital, continue to take this supplement at home. You can take it with meals, in-between meals, and/or before bedtime. These supplements can be purchased at most local grocery stores, pharmacies, and chain super-stores.  If you have any questions about your diet or nutrition, call the hospital and ask for the dietitian.   Please follow a carb control diet upon discharge